# Patient Record
Sex: MALE | Race: BLACK OR AFRICAN AMERICAN | NOT HISPANIC OR LATINO | URBAN - METROPOLITAN AREA
[De-identification: names, ages, dates, MRNs, and addresses within clinical notes are randomized per-mention and may not be internally consistent; named-entity substitution may affect disease eponyms.]

---

## 2020-01-18 ENCOUNTER — EMERGENCY (EMERGENCY)
Facility: HOSPITAL | Age: 30
LOS: 0 days | Discharge: HOME | End: 2020-01-19
Attending: EMERGENCY MEDICINE
Payer: SELF-PAY

## 2020-01-18 VITALS
OXYGEN SATURATION: 97 % | DIASTOLIC BLOOD PRESSURE: 115 MMHG | HEART RATE: 80 BPM | SYSTOLIC BLOOD PRESSURE: 159 MMHG | TEMPERATURE: 98 F | RESPIRATION RATE: 17 BRPM | WEIGHT: 177.03 LBS | HEIGHT: 69 IN

## 2020-01-18 DIAGNOSIS — M79.10 MYALGIA, UNSPECIFIED SITE: ICD-10-CM

## 2020-01-18 DIAGNOSIS — B34.9 VIRAL INFECTION, UNSPECIFIED: ICD-10-CM

## 2020-01-18 DIAGNOSIS — R09.81 NASAL CONGESTION: ICD-10-CM

## 2020-01-18 DIAGNOSIS — R50.9 FEVER, UNSPECIFIED: ICD-10-CM

## 2020-01-18 PROCEDURE — 99283 EMERGENCY DEPT VISIT LOW MDM: CPT

## 2020-01-18 PROCEDURE — 99053 MED SERV 10PM-8AM 24 HR FAC: CPT

## 2020-01-19 VITALS
RESPIRATION RATE: 18 BRPM | HEART RATE: 80 BPM | OXYGEN SATURATION: 98 % | TEMPERATURE: 99 F | DIASTOLIC BLOOD PRESSURE: 65 MMHG | SYSTOLIC BLOOD PRESSURE: 104 MMHG

## 2020-01-19 RX ORDER — ONDANSETRON 8 MG/1
1 TABLET, FILM COATED ORAL
Qty: 15 | Refills: 0
Start: 2020-01-19 | End: 2020-01-23

## 2020-01-19 RX ORDER — IBUPROFEN 200 MG
1 TABLET ORAL
Qty: 28 | Refills: 0
Start: 2020-01-19 | End: 2020-01-25

## 2020-01-19 NOTE — ED PROVIDER NOTE - OBJECTIVE STATEMENT
Pertinent PMH/PSH/FHx/SHx and Review of Systems contained within:    28yo M w no significant PMH, did not receive flu vaccine presents to ED for eval of subjective fever, nasal congestion, myalgias x2d.  Pt states he felt like his back was dry & he was achy in the back & neck, then he googled his sx & was concerned about meningitis.  Pt denies recent travel, known sick contacts Pertinent PMH/PSH/FHx/SHx and Review of Systems contained within:    30yo M w no significant PMH, did not receive flu vaccine presents to ED for eval of subjective fever, nasal congestion, myalgias x2d.  Pt states he felt like his back was dry & he was achy in the back & neck, then he googled his sx & was concerned about meningitis.  Pt denies recent travel, known sick contacts.  Pt states he has not taken any meds for sx, prefers natural remedies & has been drinking juice & water and eating fruits.    +subjective fever/chills, No photophobia/eye pain/changes in vision, No ear pain/sore throat/dysphagia, No chest pain/palpitations, no SOB/cough/wheeze/stridor, No abdominal pain, +neck/back pain, no rash, no changes in neurological status/function.

## 2020-01-19 NOTE — ED PROVIDER NOTE - PHYSICAL EXAMINATION
neg Dina's, neg Brudzinski's Gen: Alert, NAD, speaking in complete sentences  Head: NC, AT, EOMI, normal lids/conjunctiva  ENT: normal hearing, patent oropharynx without erythema/exudate, uvula midline  Neck: supple, no tenderness/meningismus/JVD, Trachea midline, neg Kernig's, neg Brudzinski's, FROM  Pulm: Bilateral clear BS, normal resp effort, no wheeze/stridor/retractions  CV: RRR, no M/R/G, +dist pulses  Abd: soft, NT/ND, +BS, no guarding/rebound tenderness  Mskel: no edema/erythema/cyanosis, motor 5/5 and equal in upper & lower ext bilaterally  Skin: no rash  Neuro: no sensory/motor deficits

## 2020-01-19 NOTE — ED ADULT NURSE NOTE - OBJECTIVE STATEMENT
Patient c/o fever, cough, chills and neck pain X 3 days. Patient believes he may have meningitis. No pmh.

## 2020-01-19 NOTE — ED PROVIDER NOTE - PATIENT PORTAL LINK FT
You can access the FollowMyHealth Patient Portal offered by [facility] by registering at the following website: [email or web address]. By joining [facility]’s FollowMyHealth portal, you will also be able to view your health information using other applications (apps) compatible with our system.

## 2020-01-19 NOTE — ED PROVIDER NOTE - CLINICAL SUMMARY MEDICAL DECISION MAKING FREE TEXT BOX
Pt w sx of viral illness, VSS in NAD.  Discussed flu, given Rx for empiric treatement & discussed need to stay hydrated & rest.  Discussed no meningismus on exam & soreness in neck related to whole body soreness from viral illness.  Dc home to fu as outpt, instructed to return to ED if sx worsen.